# Patient Record
Sex: MALE | Race: WHITE
[De-identification: names, ages, dates, MRNs, and addresses within clinical notes are randomized per-mention and may not be internally consistent; named-entity substitution may affect disease eponyms.]

---

## 2018-05-05 ENCOUNTER — HOSPITAL ENCOUNTER (EMERGENCY)
Dept: HOSPITAL 17 - NEPE | Age: 58
Discharge: HOME | End: 2018-05-05
Payer: SELF-PAY

## 2018-05-05 VITALS
RESPIRATION RATE: 20 BRPM | HEART RATE: 89 BPM | TEMPERATURE: 97.3 F | OXYGEN SATURATION: 99 % | DIASTOLIC BLOOD PRESSURE: 87 MMHG | SYSTOLIC BLOOD PRESSURE: 157 MMHG

## 2018-05-05 VITALS — RESPIRATION RATE: 16 BRPM | OXYGEN SATURATION: 99 %

## 2018-05-05 VITALS — HEIGHT: 72 IN | WEIGHT: 189.6 LBS | BODY MASS INDEX: 25.68 KG/M2

## 2018-05-05 DIAGNOSIS — K29.70: ICD-10-CM

## 2018-05-05 DIAGNOSIS — M25.512: ICD-10-CM

## 2018-05-05 DIAGNOSIS — R94.31: ICD-10-CM

## 2018-05-05 DIAGNOSIS — F17.200: ICD-10-CM

## 2018-05-05 DIAGNOSIS — D64.9: ICD-10-CM

## 2018-05-05 DIAGNOSIS — R07.9: Primary | ICD-10-CM

## 2018-05-05 DIAGNOSIS — Z79.899: ICD-10-CM

## 2018-05-05 LAB
ALBUMIN SERPL-MCNC: 3.2 GM/DL (ref 3.4–5)
ALP SERPL-CCNC: 100 U/L (ref 45–117)
ALT SERPL-CCNC: 21 U/L (ref 12–78)
AST SERPL-CCNC: 31 U/L (ref 15–37)
BASOPHILS # BLD AUTO: 0.1 TH/MM3 (ref 0–0.2)
BASOPHILS NFR BLD: 1 % (ref 0–2)
BILIRUB SERPL-MCNC: 0.3 MG/DL (ref 0.2–1)
BUN SERPL-MCNC: 20 MG/DL (ref 7–18)
CALCIUM SERPL-MCNC: 8.8 MG/DL (ref 8.5–10.1)
CHLORIDE SERPL-SCNC: 102 MEQ/L (ref 98–107)
CREAT SERPL-MCNC: 1.21 MG/DL (ref 0.6–1.3)
EOSINOPHIL # BLD: 0.1 TH/MM3 (ref 0–0.4)
EOSINOPHIL NFR BLD: 0.8 % (ref 0–4)
ERYTHROCYTE [DISTWIDTH] IN BLOOD BY AUTOMATED COUNT: 13.9 % (ref 11.6–17.2)
GFR SERPLBLD BASED ON 1.73 SQ M-ARVRAT: 62 ML/MIN (ref 89–?)
GLUCOSE SERPL-MCNC: 131 MG/DL (ref 74–106)
HCO3 BLD-SCNC: 24.2 MEQ/L (ref 21–32)
HCT VFR BLD CALC: 36.1 % (ref 39–51)
HGB BLD-MCNC: 12.4 GM/DL (ref 13–17)
INR PPP: 1.3 RATIO
LYMPHOCYTES # BLD AUTO: 2 TH/MM3 (ref 1–4.8)
LYMPHOCYTES NFR BLD AUTO: 20.7 % (ref 9–44)
MAGNESIUM SERPL-MCNC: 1.9 MG/DL (ref 1.5–2.5)
MCH RBC QN AUTO: 27 PG (ref 27–34)
MCHC RBC AUTO-ENTMCNC: 34.2 % (ref 32–36)
MCV RBC AUTO: 78.9 FL (ref 80–100)
MONOCYTE #: 0.6 TH/MM3 (ref 0–0.9)
MONOCYTES NFR BLD: 6.1 % (ref 0–8)
NEUTROPHILS # BLD AUTO: 7 TH/MM3 (ref 1.8–7.7)
NEUTROPHILS NFR BLD AUTO: 71.4 % (ref 16–70)
PLATELET # BLD: 481 TH/MM3 (ref 150–450)
PMV BLD AUTO: 6.7 FL (ref 7–11)
PROT SERPL-MCNC: 8.5 GM/DL (ref 6.4–8.2)
PROTHROMBIN TIME: 12.7 SEC (ref 9.8–11.6)
RBC # BLD AUTO: 4.58 MIL/MM3 (ref 4.5–5.9)
SODIUM SERPL-SCNC: 136 MEQ/L (ref 136–145)
TROPONIN I SERPL-MCNC: (no result) NG/ML (ref 0.02–0.05)
WBC # BLD AUTO: 9.8 TH/MM3 (ref 4–11)

## 2018-05-05 PROCEDURE — 85730 THROMBOPLASTIN TIME PARTIAL: CPT

## 2018-05-05 PROCEDURE — 93005 ELECTROCARDIOGRAM TRACING: CPT

## 2018-05-05 PROCEDURE — 85610 PROTHROMBIN TIME: CPT

## 2018-05-05 PROCEDURE — 99285 EMERGENCY DEPT VISIT HI MDM: CPT

## 2018-05-05 PROCEDURE — 96360 HYDRATION IV INFUSION INIT: CPT

## 2018-05-05 PROCEDURE — 85025 COMPLETE CBC W/AUTO DIFF WBC: CPT

## 2018-05-05 PROCEDURE — 71046 X-RAY EXAM CHEST 2 VIEWS: CPT

## 2018-05-05 PROCEDURE — 71275 CT ANGIOGRAPHY CHEST: CPT

## 2018-05-05 PROCEDURE — 80053 COMPREHEN METABOLIC PANEL: CPT

## 2018-05-05 PROCEDURE — 84484 ASSAY OF TROPONIN QUANT: CPT

## 2018-05-05 PROCEDURE — 83690 ASSAY OF LIPASE: CPT

## 2018-05-05 PROCEDURE — 96361 HYDRATE IV INFUSION ADD-ON: CPT

## 2018-05-05 PROCEDURE — 83735 ASSAY OF MAGNESIUM: CPT

## 2018-05-05 PROCEDURE — 74174 CTA ABD&PLVS W/CONTRAST: CPT

## 2018-05-05 NOTE — PD
HPI


Chief Complaint:  Chest Pain


Time Seen by Provider:  01:48


Travel History


International Travel<30 days:  No


Contact w/Intl Traveler<30days:  No


Traveled to known affect area:  No





History of Present Illness


HPI


Is a 58-year-old man who presents to the emergency department complaining of 

left shoulder and left arm pain and chest pain is been ongoing for the past 

week.  Did not come on super abruptly, but has been worsening since onset.  

States pain is severe, especially in the morning.  Sometimes eating relieves a 

little bit.  A little bit worse with any exertion.  Apparently was admitted to 

[facility] for 3 days, but reports they did not do any 

tests on him.  States they did an x-ray and EKG.  States that they "shot me up 

with morphine".  Pain is been constant since onset, worsening.  Pains mostly in 

the chest and radiating to between the shoulder blades at this time.  Patient 

admits to active IV drug use with heroin and cocaine, last used 2 hours ago.





History


Past Medical History


*** Narrative Medical


IV drug use cocaine and heroin





Past Surgical History


Surgical History:  No Previous Surgery





Social History


Alcohol Use:  No


Tobacco Use:  Yes (1 1/2 PPD)





Allergies-Medications


(Allergen,Severity, Reaction):  


Coded Allergies:  


     No Known Allergies (Verified , 6/25/13)


Reported Meds & Prescriptions





Reported Meds & Active Scripts


Active


Flexeril (Cyclobenzaprine HCl) 10 Mg Tab 10 Mg PO TID 


Ranitidine Hcl (Ranitidine HCl) 150 Mg Tab 150 Mg PO BID 


Ibuprofen 800 Mg Tab 800 Mg PO TID 


Tramadol Hcl (Tramadol HCl) 50 Mg Tab  Mg PO Q6HPRN 


Reported


No Current Meds (Miscellaneous Medication)  Misc    








Review of Systems


Except as stated in HPI:  all other systems reviewed are Neg





Physical Exam


Narrative


GENERAL: 58-year-old man, generally well-appearing, no acute distress.


SKIN: Focused skin assessment warm/dry.  Scattered skin wounds.


HEAD: Atraumatic. Normocephalic. 


EYES: Pupils equal and round. No scleral icterus. No injection or drainage. 


ENT: No nasal bleeding or discharge.  Mucous membranes pink and moist.


NECK: Trachea midline. No JVD. 


CARDIOVASCULAR: Regular rate and rhythm.  No murmur.


RESPIRATORY: No accessory muscle use. Clear to auscultation. Breath sounds 

equal bilaterally. 


GASTROINTESTINAL: Abdomen soft, non-tender, nondistended. Hepatic and splenic 

margins not palpable. 


MUSCULOSKELETAL: No obvious deformities.  No edema.


NEUROLOGICAL: Awake and alert. No obvious cranial nerve deficits.  Motor 

grossly within normal limits. Normal speech.


PSYCHIATRIC: Appropriate mood and affect; insight and judgment normal.





Data


Data


Last Documented VS





Vital Signs








  Date Time  Temp Pulse Resp B/P (MAP) Pulse Ox O2 Delivery O2 Flow Rate FiO2


 


5/5/18 02:09   16  99   


 


5/5/18 01:53  88      


 


5/5/18 01:31 97.3   157/87 (110)    








Orders





 Orders


Electrocardiogram (5/5/18 02:02)


Complete Blood Count With Diff (5/5/18 02:02)


Comprehensive Metabolic Panel (5/5/18 02:02)


Magnesium (Mg) (5/5/18 02:02)


Prothrombin Time / Inr (Pt) (5/5/18 02:02)


Act Partial Throm Time (Ptt) (5/5/18 02:02)


Troponin I (5/5/18 02:02)


Lipase (5/5/18 02:02)


Ecg Monitoring (5/5/18 02:02)


Iv Access Insert/Monitor (5/5/18 02:02)


Oximetry (5/5/18 02:02)


Oxygen Administration (5/5/18 02:02)


Sodium Chloride 0.9% Flush (Ns Flush) (5/5/18 02:15)


Sodium Chlorid 0.9% 500 Ml Inj (Ns 500 M (5/5/18 02:15)


Chest, Pa & Lat (5/5/18 02:02)


Cta Thor Abd Aorta W Iv C W3d (5/5/18 02:02)


Iohexol 350 Inj (Omnipaque 350 Inj) (5/5/18 03:18)





Labs





Laboratory Tests








Test


  5/5/18


02:13


 


White Blood Count 9.8 TH/MM3 


 


Red Blood Count 4.58 MIL/MM3 


 


Hemoglobin 12.4 GM/DL 


 


Hematocrit 36.1 % 


 


Mean Corpuscular Volume 78.9 FL 


 


Mean Corpuscular Hemoglobin 27.0 PG 


 


Mean Corpuscular Hemoglobin


Concent 34.2 % 


 


 


Red Cell Distribution Width 13.9 % 


 


Platelet Count 481 TH/MM3 


 


Mean Platelet Volume 6.7 FL 


 


Neutrophils (%) (Auto) 71.4 % 


 


Lymphocytes (%) (Auto) 20.7 % 


 


Monocytes (%) (Auto) 6.1 % 


 


Eosinophils (%) (Auto) 0.8 % 


 


Basophils (%) (Auto) 1.0 % 


 


Neutrophils # (Auto) 7.0 TH/MM3 


 


Lymphocytes # (Auto) 2.0 TH/MM3 


 


Monocytes # (Auto) 0.6 TH/MM3 


 


Eosinophils # (Auto) 0.1 TH/MM3 


 


Basophils # (Auto) 0.1 TH/MM3 


 


CBC Comment DIFF FINAL 


 


Differential Comment  


 


Prothrombin Time 12.7 SEC 


 


Prothromb Time International


Ratio 1.3 RATIO 


 


 


Activated Partial


Thromboplast Time 29.0 SEC 


 


 


Blood Urea Nitrogen 20 MG/DL 


 


Creatinine 1.21 MG/DL 


 


Random Glucose 131 MG/DL 


 


Total Protein 8.5 GM/DL 


 


Albumin 3.2 GM/DL 


 


Calcium Level 8.8 MG/DL 


 


Magnesium Level 1.9 MG/DL 


 


Alkaline Phosphatase 100 U/L 


 


Aspartate Amino Transf


(AST/SGOT) 31 U/L 


 


 


Alanine Aminotransferase


(ALT/SGPT) 21 U/L 


 


 


Total Bilirubin 0.3 MG/DL 


 


Sodium Level 136 MEQ/L 


 


Potassium Level 3.9 MEQ/L 


 


Chloride Level 102 MEQ/L 


 


Carbon Dioxide Level 24.2 MEQ/L 


 


Anion Gap 10 MEQ/L 


 


Estimat Glomerular Filtration


Rate 62 ML/MIN 


 


 


Troponin I


  LESS THAN 0.02


NG/ML


 


Lipase 74 U/L 











MDM


Medical Decision Making


Medical Screen Exam Complete:  Yes


Emergency Medical Condition:  Yes


Interpretation(s)


My review of EKG: Normal sinus rhythm at a rate of 84, normal axis, normal 

intervals, lateral T-wave flattening 1 and aVL, no definite evidence of acute 

ischemia.





LABS:


CBC mild anemia


CMP is unremarkable.


Troponins negative.


Total protein elevated.


Lipase is normal.


Coags are unremarkable.





CTA aorta: Negative





Chest x-ray is negative.


Differential Diagnosis


Gastritis/reflux/peptic ulcer disease, dissection, ACS, PE, pancreatitis, other


Narrative Course


Medical decision making





Is a 58-year-old man presents to the emergency department complaining of chest 

pain rating the left shoulder left arm and between the shoulder blades ongoing 

for the past week.  Reportedly admitted at [facility] for several days.  

Unclear why.  Patient denies any significant testing or cardiovascular testing.

  Possibly there were sitting on cultures for endocarditis.  He states he 

checked himself out after couple days.  Continues using IV heroin and cocaine.  

Pain is been ongoing.  EKG does not show any definite evidence of ischemia.  

Symptoms been constant.  Will check troponin.  Constant symptoms for 1 week 

with a negative troponin is pretty low risk for ACS.  Will check CT for 

dissection given the radiation of his pain, and the nature of it and the 

history of cocaine use.  If negative, will treat for gastritis/peptic ulcer 

disease.





Diagnosis





 Primary Impression:  


 Chest pain


 Additional Impression:  


 Gastritis





***Additional Instructions:  


Take omeprazole as prescribed.





Avoid illicit drugs.





Avoid NSAIDs such as ibuprofen, Aleve, aspirin, as well as other stomach 

irritants such as alcohol.





Follow-up with her primary physician for further evaluation.


***Med/Other Pt SpecificInfo:  Prescription(s) given


Scripts


Omeprazole (Omeprazole) 40 Mg Cap


40 MG PO DAILY, #30 CAP 0 Refills


   Prov: Donell Pierce MD         5/5/18


Disposition:  01 DISCHARGE HOME


Condition:  Stable











Donell Pierce MD May 5, 2018 02:09

## 2018-05-05 NOTE — RADRPT
EXAM DATE/TIME:  05/05/2018 03:12 

 

[facility] COMPARISON:     

No previous studies available for comparison.

 

 

INDICATIONS :     

Chest pain.

                           

 

IV CONTRAST:     

99 cc Omnipaque 350 (iohexol) IV 

 

 

RADIATION DOSE:     

8.24 CTDIvol (mGy) 

 

 

MEDICAL HISTORY :     

Gastroesophageal reflux disease.  Substance abuse. 

 

SURGICAL HISTORY :      

None. 

 

ENCOUNTER:      

Initial

 

ACUITY:      

2 weeks

 

PAIN SCALE:      

7/10

 

LOCATION:        

chest 

 

TECHNIQUE:     

Volumetric scanning was performed using a multi-row detector CT scanner.  The data was post processed
 with a variety of visualization algorithms including full volume maximum intensity projection, multi
-planar sliding thin slab reformation, curved planar reformation, and surface rendering techniques.  
Using automated exposure control and adjustment of the mA and/or kV according to patient size, radiat
ion dose was kept as low as reasonably achievable to obtain optimal diagnostic quality images.  DICOM
 format image data is available electronically for review and comparison.  

 

FINDINGS:     

 

LUNGS:     

There is no consolidation or pneumothorax.  No concerning pulmonary nodule is visualized.  No pleural
 fluid is present.

 

MEDIASTINUM:     

No abnormally enlarged lymph nodes by CT criteria. No axillary or hilar abnormalities are identified.
 

 

ABDOMEN:     

The liver and spleen are free of focal defects. The gallbladder and pancreas demonstrate no abnormali
ty. The adrenal glands are normal. The kidneys demonstrate no evidence of solid renal mass or hydrone
phrosis. No free fluid or abdominal masses are identified. No para-aortic adenopathy is seen.

 

PELVIS:     

No evidence of free fluid or pelvic mass. No abnormally enlarged inguinal or retroperitoneal lymph no
stacie are present. The bladder is unremarkable.

 

THORACIC AORTA:       

The thoracic aorta is normal in caliber throughout. No evidence of dissection. Variant arch anatomy i
s identified with direct origin of the left vertebral artery from the aortic arch. Visualized arch ve
ssels are unremarkable.

 

 ABDOMINAL AORTA:     

The aorta is normal in caliber without aneurysm or dissection.  The renal arteries are patent bilater
ally.  The proximal celiac and superior mesenteric arteries are patent and normal in diameter.   

 

PELVIC VESSELS:     

Mild eccentric disease involving iliacs. No aneurysm or significant stenosis. No dissection. Visualiz
ed proximal thigh vessels are intact

 

CONCLUSION:     

No acute findings

 

 

 

 Micah Sargent MD on May 05, 2018 at 4:40           

Board Certified Radiologist.

 This report was verified electronically.

## 2018-05-05 NOTE — RADRPT
EXAM DATE/TIME:  05/05/2018 02:36 

 

[facility] COMPARISON:     

No previous studies available for comparison.

 

                     

INDICATIONS :     

Chest pain.

                     

 

MEDICAL HISTORY :     

None.          

 

SURGICAL HISTORY :     

None.   

 

ENCOUNTER:     

Initial                                        

 

ACUITY:     

1 day      

 

PAIN SCORE:     

10/10

 

LOCATION:     

Bilateral chest 

 

FINDINGS:     

Small nodular densities fairly symmetrically present over the lower lung fields bilaterally are likel
y nipple shadows. Granuloma in the lateral left midlung. No definite infiltrate or effusion. Cardiac 
contours are satisfactory for technique and projection. Degenerative changes present in the spine.

 

CONCLUSION:     

No definite acute cardiopulmonary disease

 

 

 

 Micah Sargent MD on May 05, 2018 at 2:46           

Board Certified Radiologist.

 This report was verified electronically.

## 2018-05-05 NOTE — EKG
Date Performed: 05/05/2018       Time Performed: 01:43:50

 

PTAGE:      58 years

 

EKG:      Sinus rhythm 

 

 NONSPECIFIC T-WAVE ABNORMALITY BORDERLINE ECG

 

PREVIOUS TRACING       : 07/02/2013 20.48

 

DOCTOR:   Santiago Post  Interpretating Date/Time  05/05/2018 14:04:34

## 2018-05-06 ENCOUNTER — HOSPITAL ENCOUNTER (EMERGENCY)
Dept: HOSPITAL 17 - NEPE | Age: 58
Discharge: HOME | End: 2018-05-06
Payer: SELF-PAY

## 2018-05-06 VITALS
TEMPERATURE: 97.5 F | SYSTOLIC BLOOD PRESSURE: 135 MMHG | RESPIRATION RATE: 22 BRPM | HEART RATE: 86 BPM | OXYGEN SATURATION: 100 % | DIASTOLIC BLOOD PRESSURE: 68 MMHG

## 2018-05-06 VITALS — HEIGHT: 72 IN | BODY MASS INDEX: 25.68 KG/M2 | WEIGHT: 189.6 LBS

## 2018-05-06 DIAGNOSIS — F17.210: ICD-10-CM

## 2018-05-06 DIAGNOSIS — K21.9: ICD-10-CM

## 2018-05-06 DIAGNOSIS — M54.6: Primary | ICD-10-CM

## 2018-05-06 LAB
ALBUMIN SERPL-MCNC: 2.7 GM/DL (ref 3.4–5)
ALP SERPL-CCNC: 125 U/L (ref 45–117)
ALT SERPL-CCNC: 20 U/L (ref 12–78)
AST SERPL-CCNC: 21 U/L (ref 15–37)
BASOPHILS # BLD AUTO: 0.1 TH/MM3 (ref 0–0.2)
BASOPHILS NFR BLD: 0.9 % (ref 0–2)
BILIRUB SERPL-MCNC: 0.2 MG/DL (ref 0.2–1)
BUN SERPL-MCNC: 17 MG/DL (ref 7–18)
CALCIUM SERPL-MCNC: 8.3 MG/DL (ref 8.5–10.1)
CHLORIDE SERPL-SCNC: 108 MEQ/L (ref 98–107)
CREAT SERPL-MCNC: 0.83 MG/DL (ref 0.6–1.3)
EOSINOPHIL # BLD: 0.2 TH/MM3 (ref 0–0.4)
EOSINOPHIL NFR BLD: 2.5 % (ref 0–4)
ERYTHROCYTE [DISTWIDTH] IN BLOOD BY AUTOMATED COUNT: 13.6 % (ref 11.6–17.2)
GFR SERPLBLD BASED ON 1.73 SQ M-ARVRAT: 96 ML/MIN (ref 89–?)
GLUCOSE SERPL-MCNC: 91 MG/DL (ref 74–106)
HCO3 BLD-SCNC: 23.8 MEQ/L (ref 21–32)
HCT VFR BLD CALC: 33.1 % (ref 39–51)
HGB BLD-MCNC: 11.6 GM/DL (ref 13–17)
INR PPP: 1.2 RATIO
LYMPHOCYTES # BLD AUTO: 2 TH/MM3 (ref 1–4.8)
LYMPHOCYTES NFR BLD AUTO: 23.6 % (ref 9–44)
MCH RBC QN AUTO: 27.7 PG (ref 27–34)
MCHC RBC AUTO-ENTMCNC: 35 % (ref 32–36)
MCV RBC AUTO: 79.1 FL (ref 80–100)
MONOCYTE #: 0.7 TH/MM3 (ref 0–0.9)
MONOCYTES NFR BLD: 8.2 % (ref 0–8)
NEUTROPHILS # BLD AUTO: 5.5 TH/MM3 (ref 1.8–7.7)
NEUTROPHILS NFR BLD AUTO: 64.8 % (ref 16–70)
PLATELET # BLD: 434 TH/MM3 (ref 150–450)
PMV BLD AUTO: 6.8 FL (ref 7–11)
PROT SERPL-MCNC: 7.7 GM/DL (ref 6.4–8.2)
PROTHROMBIN TIME: 11.8 SEC (ref 9.8–11.6)
RBC # BLD AUTO: 4.19 MIL/MM3 (ref 4.5–5.9)
SODIUM SERPL-SCNC: 139 MEQ/L (ref 136–145)
TROPONIN I SERPL-MCNC: (no result) NG/ML (ref 0.02–0.05)
WBC # BLD AUTO: 8.5 TH/MM3 (ref 4–11)

## 2018-05-06 PROCEDURE — 84484 ASSAY OF TROPONIN QUANT: CPT

## 2018-05-06 PROCEDURE — 71045 X-RAY EXAM CHEST 1 VIEW: CPT

## 2018-05-06 PROCEDURE — 99285 EMERGENCY DEPT VISIT HI MDM: CPT

## 2018-05-06 PROCEDURE — 85730 THROMBOPLASTIN TIME PARTIAL: CPT

## 2018-05-06 PROCEDURE — 85610 PROTHROMBIN TIME: CPT

## 2018-05-06 PROCEDURE — 93005 ELECTROCARDIOGRAM TRACING: CPT

## 2018-05-06 PROCEDURE — 80053 COMPREHEN METABOLIC PANEL: CPT

## 2018-05-06 PROCEDURE — 82550 ASSAY OF CK (CPK): CPT

## 2018-05-06 PROCEDURE — 85025 COMPLETE CBC W/AUTO DIFF WBC: CPT

## 2018-05-06 NOTE — RADRPT
EXAM DATE/TIME:  05/06/2018 05:27 

 

[facility] COMPARISON:     

CHEST PA & LAT, May 05, 2018, 2:36.

 

                     

INDICATIONS :     

Chest pain. 

                     

 

MEDICAL HISTORY :            

Gastroesophageal reflux disease. Substance abuse   

 

SURGICAL HISTORY :     

None.   

 

ENCOUNTER:     

Initial                                        

 

ACUITY:     

1 day      

 

PAIN SCORE:     

8/10

 

LOCATION:     

Bilateral chest 

 

FINDINGS:     

A single view of the chest demonstrates the lungs to be symmetrically aerated without evidence of mas
s, infiltrate or effusion.  The cardiomediastinal contours are unremarkable.  Osseous structures are 
intact.

 

CONCLUSION:     No acute disease.  

 

 

 

 Micah Sargent MD on May 06, 2018 at 6:21           

Board Certified Radiologist.

 This report was verified electronically.

## 2018-05-06 NOTE — EKG
Date Performed: 05/06/2018       Time Performed: 05:15:44

 

PTAGE:      58 years

 

EKG:      Sinus rhythm 

 

 NORMAL ECG

 

PREVIOUS TRACING       : 05/05/2018 01.43 Since the previous tracing, no significant change noted

 

DOCTOR:   Santiago Post  Interpretating Date/Time  05/06/2018 12:33:15

## 2018-05-06 NOTE — PD
HPI


Chief Complaint:  Chest Pain


Time Seen by Provider:  04:53


Travel History


International Travel<30 days:  No


Contact w/Intl Traveler<30days:  No


Traveled to known affect area:  No





History of Present Illness


HPI


58 years old male complains of a back pain.  Patient states that the pain 

started about 2 weeks ago.  Patient states that the pain is sharp pain and 

cramping pain localized between the shoulder blades and upper back area.  

Patient denies any pain radiation.  Patient denies palpitation nausea 

diaphoresis.  Patient states that patient was admitted for hospital recently 

for chest pain.  Patient also was seen in emergency room at [facility] 

last night for left shoulder pain left arm pain.  Workup including EKG, cardiac 

enzymes and CTA negative acute pathology.  Patient admitted to using cocaine 

heroine recently.  Patient denies history hypertension, diabetes, 

hyperlipidemia.  Patient has history of GERD, depression.  Patient is a smoker.

  On a scale from 1-10 the pain is a 10.





PFSH


Past Medical History


Depression:  Yes


Diminished Hearing:  No


Gastrointestinal Disorders:  Yes


GERD:  Yes


Musculoskeletal:  Yes


Psychiatric:  Yes (DEPRESSION, SUICIDAL, HOPELESSNESS)





Past Surgical History


Surgical History:  No Previous Surgery


Other Surgery:  No





Social History


Alcohol Use:  No


Tobacco Use:  Yes (1 1/2 PPD)


Substance Use:  Yes (PERSCRIPTION MEDS, Cocaine , Heroin)





Allergies-Medications


(Allergen,Severity, Reaction):  


Coded Allergies:  


     No Known Allergies (Verified , 6/25/13)


Reported Meds & Prescriptions





Reported Meds & Active Scripts


Active


Robaxin (Methocarbamol) 750 Mg Tab 750 Mg PO QID


Omeprazole 40 Mg Cap 40 Mg PO DAILY


Flexeril (Cyclobenzaprine HCl) 10 Mg Tab 10 Mg PO TID


Ranitidine 150 mg (Ranitidine HCl) 150 Mg Tab 150 Mg PO BID


Ibuprofen 800 Mg Tab 800 Mg PO TID


Tramadol Hcl (Tramadol HCl) 50 Mg Tab  Mg PO Q6HPRN


Reported


No Current Meds (Miscellaneous Medication)  Misc   








Review of Systems


General / Constitutional:  No: Fever


Eyes:  No: Visual changes


HENT:  No: Headaches


Cardiovascular:  No: Chest Pain or Discomfort


Respiratory:  No: Shortness of Breath


Gastrointestinal:  No: Abdominal Pain


Genitourinary:  No: Dysuria


Musculoskeletal:  No: Pain


Skin:  No Rash


Neurologic:  No: Weakness


Psychiatric:  No: Depression


Endocrine:  No: Polydipsia


Hematologic/Lymphatic:  No: Easy Bruising





Physical Exam


Narrative


GENERAL: Well-nourished, well-developed patient.


SKIN: Focused skin assessment warm/dry.


HEAD: Normocephalic.


EYES: No scleral icterus. No injection or drainage. 


NECK: Supple, trachea midline. No JVD or lymphadenopathy.


CARDIOVASCULAR: Regular rate and rhythm without murmurs, gallops, or rubs. 


RESPIRATORY: Breath sounds equal bilaterally. No accessory muscle use.


GASTROINTESTINAL: Abdomen soft, non-tender, nondistended. 


MUSCULOSKELETAL: No cyanosis, or edema. 


BACK: Nontender without obvious deformity. No CVA tenderness.


Neurologic exam normal.





Data


Data


Last Documented VS





Vital Signs








  Date Time  Temp Pulse Resp B/P (MAP) Pulse Ox O2 Delivery O2 Flow Rate FiO2


 


5/6/18 04:48 97.5 86 22 135/68 (90) 100 Room Air  








Orders





 Orders


Electrocardiogram (5/6/18 05:02)


Complete Blood Count With Diff (5/6/18 05:02)


Comprehensive Metabolic Panel (5/6/18 05:02)


Creatine Kinase (Cpk) (5/6/18 05:02)


Troponin I (5/6/18 05:02)


Prothrombin Time / Inr (Pt) (5/6/18 05:02)


Act Partial Throm Time (Ptt) (5/6/18 05:02)


Chest, Single Ap (5/6/18 05:02)


Iv Access Insert/Monitor (5/6/18 05:02)


Ecg Monitoring (5/6/18 05:02)


Oximetry (5/6/18 05:02)





Labs





Laboratory Tests








Test


  5/6/18


05:18


 


White Blood Count 8.5 TH/MM3 


 


Red Blood Count 4.19 MIL/MM3 


 


Hemoglobin 11.6 GM/DL 


 


Hematocrit 33.1 % 


 


Mean Corpuscular Volume 79.1 FL 


 


Mean Corpuscular Hemoglobin 27.7 PG 


 


Mean Corpuscular Hemoglobin


Concent 35.0 % 


 


 


Red Cell Distribution Width 13.6 % 


 


Platelet Count 434 TH/MM3 


 


Mean Platelet Volume 6.8 FL 


 


Neutrophils (%) (Auto) 64.8 % 


 


Lymphocytes (%) (Auto) 23.6 % 


 


Monocytes (%) (Auto) 8.2 % 


 


Eosinophils (%) (Auto) 2.5 % 


 


Basophils (%) (Auto) 0.9 % 


 


Neutrophils # (Auto) 5.5 TH/MM3 


 


Lymphocytes # (Auto) 2.0 TH/MM3 


 


Monocytes # (Auto) 0.7 TH/MM3 


 


Eosinophils # (Auto) 0.2 TH/MM3 


 


Basophils # (Auto) 0.1 TH/MM3 


 


CBC Comment DIFF FINAL 


 


Differential Comment  


 


Prothrombin Time 11.8 SEC 


 


Prothromb Time International


Ratio 1.2 RATIO 


 


 


Activated Partial


Thromboplast Time 30.1 SEC 


 


 


Blood Urea Nitrogen 17 MG/DL 


 


Creatinine 0.83 MG/DL 


 


Random Glucose 91 MG/DL 


 


Total Protein 7.7 GM/DL 


 


Albumin 2.7 GM/DL 


 


Calcium Level 8.3 MG/DL 


 


Alkaline Phosphatase 125 U/L 


 


Aspartate Amino Transf


(AST/SGOT) 21 U/L 


 


 


Alanine Aminotransferase


(ALT/SGPT) 20 U/L 


 


 


Total Bilirubin 0.2 MG/DL 


 


Sodium Level 139 MEQ/L 


 


Potassium Level 3.9 MEQ/L 


 


Chloride Level 108 MEQ/L 


 


Carbon Dioxide Level 23.8 MEQ/L 


 


Anion Gap 7 MEQ/L 


 


Estimat Glomerular Filtration


Rate 96 ML/MIN 


 


 


Total Creatine Kinase 66 U/L 


 


Troponin I


  LESS THAN 0.02


NG/ML











MDM


Medical Decision Making


Medical Screen Exam Complete:  Yes


Emergency Medical Condition:  Yes


Interpretation(s)


6:21 AM.  EKG shows sinus rhythm nonspecific ST-T wave change.  CBC within 

normal limits.  CMP within normal limits.  Cardiac enzymes are normal.


Differential Diagnosis


Differential diagnosis including musculoskeletal, angina, MI, PE, pneumothorax.


Narrative Course


58-year-old male with persistent back pain.  History of heroine and cocaine 

abuse.





Diagnosis





 Primary Impression:  


 Upper back pain


Patient Instructions:  General Instructions





***Additional Instructions:  


Take medication as needed for pain.  Follow-up with personal physician.  

Advised patient to avoid abusing drugs.  [facility] as needed.


***Med/Other Pt SpecificInfo:  Prescription(s) given


Scripts


Methocarbamol (Robaxin) 750 Mg Tab


750 MG PO QID for Muscle Spasm, #40 TAB 0 Refills


   Prov: Jamil Brewer MD         5/6/18


Disposition:  01 DISCHARGE HOME


Condition:  Stable











Jamil Brewer MD May 6, 2018 05:09